# Patient Record
Sex: MALE | Race: WHITE | NOT HISPANIC OR LATINO | URBAN - METROPOLITAN AREA
[De-identification: names, ages, dates, MRNs, and addresses within clinical notes are randomized per-mention and may not be internally consistent; named-entity substitution may affect disease eponyms.]

---

## 2024-03-27 ENCOUNTER — EMERGENCY (EMERGENCY)
Facility: HOSPITAL | Age: 19
LOS: 1 days | Discharge: HOME | End: 2024-03-27
Admitting: EMERGENCY MEDICINE
Payer: COMMERCIAL

## 2024-03-27 VITALS
TEMPERATURE: 97 F | HEART RATE: 77 BPM | WEIGHT: 123.02 LBS | SYSTOLIC BLOOD PRESSURE: 135 MMHG | OXYGEN SATURATION: 98 % | HEIGHT: 72 IN | DIASTOLIC BLOOD PRESSURE: 83 MMHG | RESPIRATION RATE: 16 BRPM

## 2024-03-27 DIAGNOSIS — R22.31 LOCALIZED SWELLING, MASS AND LUMP, RIGHT UPPER LIMB: ICD-10-CM

## 2024-03-27 DIAGNOSIS — L72.9 FOLLICULAR CYST OF THE SKIN AND SUBCUTANEOUS TISSUE, UNSPECIFIED: ICD-10-CM

## 2024-03-27 PROCEDURE — 99284 EMERGENCY DEPT VISIT MOD MDM: CPT

## 2024-03-27 PROCEDURE — 99282 EMERGENCY DEPT VISIT SF MDM: CPT

## 2024-03-27 PROCEDURE — 76882 US LMTD JT/FCL EVL NVASC XTR: CPT

## 2024-03-27 NOTE — ED PROVIDER NOTE - CLINICAL SUMMARY MEDICAL DECISION MAKING FREE TEXT BOX
19 yo m with no pmh c/o bump to R forearm that he noticed 6 days ago. Denies trauma. Pt states its painful if he touches it. Went to [facility] last week and was referred to a pcp who then scheduled him for an ultrasound next week. Pt is concerned because no one knew what it was. Denies fever, chills, redness, drainage. R volar forearm- 1x1cm firm mobile mass, no erythema, no fluctuance - bedside sono 0.3x0.3 cyst. likely ganglion cyst, will refer to plastics

## 2024-03-27 NOTE — ED PROVIDER NOTE - NSFOLLOWUPINSTRUCTIONS_ED_ALL_ED_FT
Ganglion Cyst    A ganglion cyst is a non-cancerous, fluid-filled lump of tissue that occurs near a joint, tendon, or ligament. The cyst grows out of a joint or the lining of a tendon or ligament. Ganglion cysts most often develop in the hand or wrist, but they can also develop in the shoulder, elbow, hip, knee, ankle, or foot.    Ganglion cysts are ball-shaped or egg-shaped. Their size can range from the size of a pea to larger than a grape. Increased activity may cause the cyst to get bigger because more fluid starts to build up.    What are the causes?  The exact cause of this condition is not known, but it may be related to:  Inflammation or irritation around the joint.  An injury or tear in the layers of tissue around the joint (joint capsule).  Repetitive movements or overuse.  History of acute or repeated injury.  What increases the risk?  You are more likely to develop this condition if:  You are a female.  You are 20–40 years old.  What are the signs or symptoms?    The main symptom of this condition is a lump. It most often appears on the hand or wrist. In many cases, there are no other symptoms, but a cyst can sometimes cause:  Tingling.  Pain or tenderness.  Numbness.  Weakness or loss of strength in the affected joint.  Decreased range of motion in the affected area of the body.  How is this diagnosed?  Ganglion cysts are usually diagnosed based on a physical exam. Your health care provider will feel the lump and may shine a light next to it. If it is a ganglion cyst, the light will likely shine through it.    Your health care provider may order an X-ray, ultrasound, MRI, or CT scan to rule out other conditions.    How is this treated?  Ganglion cysts often go away on their own without treatment. If you have pain or other symptoms, treatment may be needed. Treatment is also needed if the ganglion cyst limits your movement or if it gets infected. Treatment may include:  Wearing a brace or splint on your wrist or finger.  Taking anti-inflammatory medicine.  Having fluid drained from the lump with a needle (aspiration).  Getting an injection of medicine into the joint to decrease inflammation. This may be corticosteroids, ethanol, or hyaluronidase.  Having surgery to remove the ganglion cyst.  Placing a pad in your shoe or wearing shoes that will not rub against the cyst if it is on your foot.  Follow these instructions at home:  Do not press on the ganglion cyst, poke it with a needle, or hit it.  Take over-the-counter and prescription medicines only as told by your health care provider.  If you have a brace or splint:  Wear it as told by your health care provider.  Remove it as told by your health care provider. Ask if you need to remove it when you take a shower or a bath.  Watch your ganglion cyst for any changes.  Keep all follow-up visits as told by your health care provider. This is important.  Contact a health care provider if:  Your ganglion cyst becomes larger or more painful.  You have pus coming from the lump.  You have weakness or numbness in the affected area.  You have a fever or chills.  Get help right away if:  You have a fever and have any of these in the cyst area:  Increased redness.  Red streaks.  Swelling.  Summary  A ganglion cyst is a non-cancerous, fluid-filled lump that occurs near a joint, tendon, or ligament.  Ganglion cysts most often develop in the hand or wrist, but they can also develop in the shoulder, elbow, hip, knee, ankle, or foot.  Ganglion cysts often go away on their own without treatment.  This information is not intended to replace advice given to you by your health care provider. Make sure you discuss any questions you have with your health care provider.

## 2024-03-27 NOTE — ED PROVIDER NOTE - PHYSICAL EXAMINATION
CONSTITUTIONAL: Well-appearing;  in no apparent distress.   HEAD: Normocephalic; atraumatic.   EYES: PERRL; EOM intact; conjunctiva and sclera clear  SKIN: R volar forearm- 1x1cm firm mobile mass, no erythema, no fluctuance - bedside sono 0.3x0.3 cyst

## 2024-03-27 NOTE — ED PROVIDER NOTE - CARE PROVIDER_API CALL
David Munoz  Plastic Surgery  [address] [place] [id number]  Phone: [phone]  Fax: [phone]  Follow Up Time:

## 2024-03-27 NOTE — ED PROVIDER NOTE - OBJECTIVE STATEMENT
19 yo m with no pmh c/o bump to R forearm that he noticed 6 days ago. Denies trauma. Pt states its painful if he touches it. Went to [facility] last week and was referred to a pcp who then scheduled him for an ultrasound next week. Pt is concerned because no one knew what it was. Denies fever, chills, redness, drainage.

## 2024-03-27 NOTE — ED ADULT NURSE NOTE - OBJECTIVE STATEMENT
Pt is a 19yo male presenting to ED c/o wound check. Pt states, "I have this lump on my right forearm that I noticed on thursday that only hurts when I touch it, I have gone to urgent care to get it checked out and they have not been able to tell me anything and I feel like it has been getting bigger since thursday. I just want a scan." Upon assessment pt has a small, firm, raised area on forearm.

## 2024-03-27 NOTE — ED ADULT NURSE NOTE - CHIEF COMPLAINT QUOTE
Pt presents to ED C/O R forearm " lump" noticed on Thurs that is tender to touch. Denies known injury.

## 2024-03-27 NOTE — ED ADULT TRIAGE NOTE - GLASGOW COMA SCALE: EYE OPENING, MLM
pt c/o right arm, neck, back, chest pain s/p fall yesterday 4-5 steps. unknown LOC, denies blood thinners. (E4) spontaneous

## 2024-05-20 ENCOUNTER — NON-APPOINTMENT (OUTPATIENT)
Age: 19
End: 2024-05-20

## 2024-05-20 PROBLEM — Z00.00 ENCOUNTER FOR PREVENTIVE HEALTH EXAMINATION: Status: ACTIVE | Noted: 2024-05-20

## 2024-05-29 ENCOUNTER — APPOINTMENT (OUTPATIENT)
Dept: HEMATOLOGY ONCOLOGY | Facility: CLINIC | Age: 19
End: 2024-05-29

## 2024-06-06 NOTE — DISCUSSION/SUMMARY
[FreeTextEntry1] : REASON FOR CONSULT: Rodger Braxton is a 18-year-old male self-referred for cascade testing and risk assessment due to a familial BRCA1 mutation detected recently in his father. Mr. Braxton was seen on May 29, 2024 at which time medical and family history was ascertained and a pedigree constructed. This consultation was conducted as joint appointment with patient's 2 other siblings, Laura and Rodger.   RELEVANT MEDICAL HISTORY: Mr. Braxton is a healthy individual with no reported history of cancer. She has a family history of Hereditary Breast and Ovarian Cancer Syndrome (HBOC) due to pathogenic BRCA1 mutation, see below.  OTHIS MEDICAL AND SURGICAL HISTORY: -	Medical History: No significant history reported -	Surgical History: No surgical history reported  CANCER SCREENING HISTORY:   Colon: -	Colonoscopy: No Prostate: -	PSA: No -	DELONTE: No Skin:   -	FBSE: No -	Lesions biopsied/removed: No  SOCIAL HISTORY: -	Tobacco-product use: Yes, one cigarette every so often, vape occasionally  -	Environmental exposures:  so possibly   FAMILY HISTORY: Maternal ancestry was reported as [place]er/[place]ian/[place]aj[place] (some Ashkenazi Orthodox ancestry on 23&Me) and paternal ancestry was reported as [place]. A detailed family history of cancer was ascertained, see below and scanned chart for pedigree.   Mr. Braxton's father was diagnosed with metastatic pancreatic cancer in March 2024 at the age of 53. He was seen by [facility] oncologist, Dr. Viri Chowdhury, who ordered genetic testing using [facility]Next panel reported on 4/28/24. Results revealed a pathogenic mutation in the BRCA1 gene (c.[id number]; [id number]). Patient then transferred care to [facility] however passed away due to disease a few weeks ago.  	 According to Mr. Braxton no one else in the family has had germline testing for cancer susceptibility. Consanguinity was denied.  	 RISK ASSESSMENT: It was discussed Mr. Braxton has a 50% chance to test positive for the same pathogenic BRCA1 mutation detected in his father. The patient meets National Comprehensive Cancer Network (NCCN) criteria for genetic testing. Mr. Braxton stated she would like to proceed with a comprehensive genetic testing panel. Therefore, we recommended genetic testing using [facility]Next panel. This test analyzes 34 genes: APC, YARIEL, AXIN2, BARD1, BMPR1A, BRCA1, BRCA2, BRIP1, CDH1, CDK4, CDKN2A, CHEK2, DICER1, EPCAM, GREM1, HOXB13, MLH1, MSH2, MSH3, MSH6, MUTYH, NF1, NTHL1, PALB2, PMS2, POLD1, POLE, PTEN, RAD51C, RAD51D, SMAD4, SMARCA4, STK11, and TP53.  The risks, benefits and limitations of genetic testing were discussed with Mr. Braxton. In addition, we discussed the purpose of genetic testing and possible test results (positive, negative, inconclusive) along with associated medical management options and psychosocial implications. Insurance coverage and potential out of pocket costs were also discussed. The Genetic Information Non-discrimination Act (RAQUEL) was reviewed.  It was explained that risk assessment is based upon medical and family history as provided and may change in the future should new information be obtained.   Following our discussion, Mr. Braxton consented to the above-mentioned genetic testing panel. Blood was drawn in our laboratory and sent to [facility] today.  PLAN:  1.	Blood drawn today will be sent to [facility] for analysis.  2.	We will contact Mr. Braxton to schedule a follow-up appointment once the results are available. Results generally return in 2-3 weeks.   For any additional questions please call Cancer Genetics at [phone].    Mary Denson MS, [facility] Genetic Counselor, Cancer Genetics

## 2024-06-19 ENCOUNTER — NON-APPOINTMENT (OUTPATIENT)
Age: 19
End: 2024-06-19

## 2024-06-19 NOTE — DISCUSSION/SUMMARY
[FreeTextEntry1] : Patient informed genetic testing came back positive for the same pathogenic BRCA1 mutation (c.676delT) identified in his father. We discussed we will have him return for a follow-up to review implications and management.  Mary Denson MS, [facility] Cancer Genetic Counselor

## 2024-10-15 ENCOUNTER — APPOINTMENT (OUTPATIENT)
Dept: HEMATOLOGY ONCOLOGY | Facility: CLINIC | Age: 19
End: 2024-10-15

## 2024-10-15 PROCEDURE — 99203 OFFICE O/P NEW LOW 30 MIN: CPT
